# Patient Record
Sex: MALE | URBAN - METROPOLITAN AREA
[De-identification: names, ages, dates, MRNs, and addresses within clinical notes are randomized per-mention and may not be internally consistent; named-entity substitution may affect disease eponyms.]

---

## 2022-05-10 ENCOUNTER — NURSE ONLY (OUTPATIENT)
Dept: PRIMARY CARE CLINIC | Age: 27
End: 2022-05-10

## 2022-05-10 DIAGNOSIS — Z11.1 VISIT FOR MANTOUX TEST: Primary | ICD-10-CM

## 2022-05-10 PROCEDURE — 86580 TB INTRADERMAL TEST: CPT | Performed by: INTERNAL MEDICINE

## 2022-05-10 SDOH — ECONOMIC STABILITY: FOOD INSECURITY: WITHIN THE PAST 12 MONTHS, THE FOOD YOU BOUGHT JUST DIDN'T LAST AND YOU DIDN'T HAVE MONEY TO GET MORE.: NEVER TRUE

## 2022-05-10 SDOH — ECONOMIC STABILITY: FOOD INSECURITY: WITHIN THE PAST 12 MONTHS, YOU WORRIED THAT YOUR FOOD WOULD RUN OUT BEFORE YOU GOT MONEY TO BUY MORE.: NEVER TRUE

## 2022-05-10 ASSESSMENT — PATIENT HEALTH QUESTIONNAIRE - PHQ9
SUM OF ALL RESPONSES TO PHQ QUESTIONS 1-9: 0
SUM OF ALL RESPONSES TO PHQ QUESTIONS 1-9: 0
1. LITTLE INTEREST OR PLEASURE IN DOING THINGS: 0
SUM OF ALL RESPONSES TO PHQ QUESTIONS 1-9: 0
2. FEELING DOWN, DEPRESSED OR HOPELESS: 0
SUM OF ALL RESPONSES TO PHQ QUESTIONS 1-9: 0
SUM OF ALL RESPONSES TO PHQ9 QUESTIONS 1 & 2: 0

## 2022-05-10 ASSESSMENT — SOCIAL DETERMINANTS OF HEALTH (SDOH): HOW HARD IS IT FOR YOU TO PAY FOR THE VERY BASICS LIKE FOOD, HOUSING, MEDICAL CARE, AND HEATING?: NOT HARD AT ALL

## 2022-05-10 NOTE — PROGRESS NOTES
PPD Placement note  Alyssa Ventura, 28 y.o. male is here today for placement of PPD test  Reason for PPD test: School  Pt taken PPD test before: yes  Verified in allergy area and with patient that they are not allergic to the products PPD is made of (Phenol or Tween). No:  Is patient taking any oral or IV steroid medication now or have they taken it in the last month? no  Has the patient been in recent contact with anyone known or suspected of having active TB disease?: no  PPD placed on 5/10/2022. Patient advised to return for reading within 48-72 hours.

## 2022-05-10 NOTE — PATIENT INSTRUCTIONS
RETURN IN 48 HRS FOR READING. Patient Education        Tuberculin Skin Test: Care Instructions  What is it? The tuberculosis (TB) skin test can tell if you have TB bacteria in your body. Tuberculosis (TB) is a bacterial infection that can damage the lungs or otherparts of the body. Many people are exposed to TB and test positive for TB bacteria in their bodies, but they don't get the disease. TB bacteria can stay in your body without making you sick. This is because your immune system can keep TB incheck. Why is the test done? Your doctor may want you to have this test if you have been in close contact with someone who has tuberculosis (TB). You may also have the test if you have symptoms that might be causing TB. These include a cough that doesn't go awayand unexplained weight loss. How do you prepare for the test?  In general, there's nothing you have to do before this test, unless your doctortells you to. How is the test done? For a tuberculin skin test, you sit down and turn the inner side of your forearm up. The skin where the test is done is cleaned and allowed to dry. A small shot of the tuberculosis antigen (purified protein derivative, or PPD) is put under the top layer of skin. The fluid makes a little bump (wheal) underthe skin. A [place] may be drawn around the test area with a pen. What happens after the test?   Do not cover the site with a bandage.  You must see your doctor 2 to 3 days after the test to have the skin test checked. If you have TB in your body, a firm red bump will form at the shot site within 2 days.  If the test shows that you are infected with TB (positive), your doctor probably will order more tests. A TB-positive skin test can't tell when you became infected with TB. And it can't tell whether the infection can be passed to others. How can you care for yourself at home?  Do not scratch the test site. Scratching it may cause redness or swelling.  This could affect the test results.  To ease itching, put a cold washcloth on the site. Then pat the site dry.  Do not cover the test site with a bandage or other dressing. Follow-up care is a key part of your treatment and safety. Be sure to make and go to all appointments, and call your doctor if you arehaving problems. Ask your doctor when you can expect to have your test results. Where can you learn more? Go to https://Videostrippepiceweb.[employer]. org and sign in to your "[employer] account. Enter [phone] in the [facility] box to learn more about \"Tuberculin Skin Test: Care Instructions. \"     If you do not have an account, please click on the \"Sign Up Now\" link. Current as of: July 1, 2021               Content Version: 13.2  © 5614-2560 [employer]. Care instructions adapted under license by [facility] ([facility]). If you have questions about a medical condition or this instruction, always ask your healthcare professional. [place] [address] any warranty or liability for your use of this information.

## 2022-05-12 ENCOUNTER — NURSE ONLY (OUTPATIENT)
Dept: PRIMARY CARE CLINIC | Age: 27
End: 2022-05-12

## 2022-05-12 DIAGNOSIS — Z11.1 ENCOUNTER FOR PPD SKIN TEST READING: Primary | ICD-10-CM

## 2023-06-28 ENCOUNTER — HOSPITAL ENCOUNTER (OUTPATIENT)
Age: 28
Discharge: HOME | End: 2023-06-28

## 2023-06-28 PROCEDURE — 86481 TB AG RESPONSE T-CELL SUSP: CPT

## 2023-06-30 LAB — T-SPOT TB TEST: NORMAL

## 2025-03-18 ENCOUNTER — OFFICE VISIT (OUTPATIENT)
Dept: FAMILY MEDICINE CLINIC | Age: 30
End: 2025-03-18
Payer: COMMERCIAL

## 2025-03-18 VITALS
HEART RATE: 84 BPM | WEIGHT: 254.6 LBS | SYSTOLIC BLOOD PRESSURE: 130 MMHG | RESPIRATION RATE: 18 BRPM | DIASTOLIC BLOOD PRESSURE: 82 MMHG | OXYGEN SATURATION: 97 % | TEMPERATURE: 97.4 F

## 2025-03-18 DIAGNOSIS — R50.9 FEVER AND CHILLS: ICD-10-CM

## 2025-03-18 DIAGNOSIS — J10.1 INFLUENZA A: Primary | ICD-10-CM

## 2025-03-18 LAB
INFLUENZA A ANTIGEN, POC: POSITIVE
INFLUENZA B ANTIGEN, POC: NEGATIVE
VALID INTERNAL CONTROL, POC: ABNORMAL

## 2025-03-18 PROCEDURE — 99203 OFFICE O/P NEW LOW 30 MIN: CPT | Performed by: NURSE PRACTITIONER

## 2025-03-18 PROCEDURE — 87502 INFLUENZA DNA AMP PROBE: CPT | Performed by: NURSE PRACTITIONER

## 2025-03-18 NOTE — PROGRESS NOTES
[facility] PHYSICIANS [facility] WALK-IN  [address]  [address]  [place] [id number]  Dept: [phone]     Fredy Pandya is a 29 y.o. male New patient, who presents to the walk-in clinic today with conditions/complaints as noted below:    Chief Complaint   Patient presents with    Cough     Productive cough x3 days    Generalized Body Aches     Muscle/headaches     Fever     Low grade          HPI:     HPI  Pt presented to the clinic today with c/o fever. This is a new problem. The current episode started 3 days ago. Associated symptoms include: cough, body aches, runny nose, congestion, sore throat.  Pertinent negatives include: No SOB, ear pain, CP .  Pt has tried tylenol, dayquil with little improvement.        History reviewed. No pertinent past medical history.    No current outpatient medications on file.     No current facility-administered medications for this visit.       No Known Allergies    Review of Systems:     Review of Systems See HPI    Physical Exam:      /82   Pulse 84   Temp 97.4 °F (36.3 °C)   Resp 18   Wt 115.5 kg (254 lb 9.6 oz)   SpO2 97%     Physical Exam  Vitals and nursing note reviewed.   Constitutional:       General: He is not in acute distress.     Appearance: Normal appearance. He is ill-appearing. He is not toxic-appearing.   HENT:      Head: Normocephalic.      Right Ear: Ear canal and external ear normal. Tympanic membrane is erythematous. Tympanic membrane is not bulging.      Left Ear: Tympanic membrane, ear canal and external ear normal.      Nose: Congestion and rhinorrhea present.      Mouth/Throat:      Mouth: Mucous membranes are moist.      Pharynx: Uvula midline. Posterior oropharyngeal erythema present.   Cardiovascular:      Rate and Rhythm: Normal rate.      Heart sounds: Normal heart sounds.   Pulmonary:      Effort: Pulmonary effort is normal.      Breath sounds: Normal breath sounds. No